# Patient Record
Sex: MALE | Race: WHITE | NOT HISPANIC OR LATINO | URBAN - METROPOLITAN AREA
[De-identification: names, ages, dates, MRNs, and addresses within clinical notes are randomized per-mention and may not be internally consistent; named-entity substitution may affect disease eponyms.]

---

## 2023-04-03 ENCOUNTER — EMERGENCY (EMERGENCY)
Facility: HOSPITAL | Age: 68
LOS: 1 days | Discharge: HOME | End: 2023-04-03
Attending: EMERGENCY MEDICINE | Admitting: EMERGENCY MEDICINE
Payer: MEDICARE

## 2023-04-03 VITALS
HEIGHT: 70 IN | HEART RATE: 105 BPM | RESPIRATION RATE: 16 BRPM | TEMPERATURE: 98 F | OXYGEN SATURATION: 95 % | WEIGHT: 190.04 LBS | DIASTOLIC BLOOD PRESSURE: 77 MMHG | SYSTOLIC BLOOD PRESSURE: 139 MMHG

## 2023-04-03 PROCEDURE — 23650 CLTX SHO DSLC W/MNPJ WO ANES: CPT | Mod: 54,RT

## 2023-04-03 PROCEDURE — 99284 EMERGENCY DEPT VISIT MOD MDM: CPT | Mod: FS,57

## 2023-04-03 PROCEDURE — 73030 X-RAY EXAM OF SHOULDER: CPT | Mod: 26,RT

## 2023-04-03 NOTE — ED PROVIDER NOTE - OBJECTIVE STATEMENT
67-year-old male with history of hypertension, hyperlipidemia, diabetes, RSD presents to the ED complaining of right shoulder pain status post trip and fall while walking his dog a few hours ago.  Patient landed on his right shoulder and feels like it is dislocated.  No additional injury.  No head trauma or LOC.  No anticoagulation.  Denies fever, chills, chest pain, shortness of breath, abdominal pain, nausea, vomiting, upper or lower extremity weakness or paresthesias.

## 2023-04-03 NOTE — ED ADULT TRIAGE NOTE - NS ED TRIAGE AVPU SCALE
Alert-The patient is alert, awake and responds to voice. The patient is oriented to time, place, and person. The triage nurse is able to obtain subjective information.
glasses

## 2023-04-03 NOTE — ED PROVIDER NOTE - NS ED ATTENDING STATEMENT MOD
This was a shared visit with the LESLY. I reviewed and verified the documentation and independently performed the documented:

## 2023-04-03 NOTE — ED PROVIDER NOTE - NSFOLLOWUPINSTRUCTIONS_ED_ALL_ED_FT
Shoulder Dislocation    WHAT YOU NEED TO KNOW:    A shoulder dislocation happens when the top of your arm bone (humerus) moves out of the socket in your shoulder blade.        DISCHARGE INSTRUCTIONS:    Return to the emergency department if:   •Your shoulder and arm become pale or cold.      •You cannot move your shoulder and arm.      •You have more redness or swelling in your shoulder.      •Your shoulder becomes dislocated again.      Call your doctor if:   •You have a fever.      •You have more pain in your shoulder and arm even after you rest and take your medicine.      •You have new weakness or numbness in your shoulder and arm.      •You have questions or concerns about your condition or care.      Medicines:You may need any of the following:   •Prescription pain medicine may be given. Ask your healthcare provider how to take this medicine safely. Some prescription pain medicines contain acetaminophen. Do not take other medicines that contain acetaminophen without talking to your healthcare provider. Too much acetaminophen may cause liver damage. Prescription pain medicine may cause constipation. Ask your healthcare provider how to prevent or treat constipation.       •A muscle relaxer may help the tight muscles in your shoulder relax.      •Take your medicine as directed. Contact your healthcare provider if you think your medicine is not helping or if you have side effects. Tell him or her if you are allergic to any medicine. Keep a list of the medicines, vitamins, and herbs you take. Include the amounts, and when and why you take them. Bring the list or the pill bottles to follow-up visits. Carry your medicine list with you in case of an emergency.      Rest your shoulder and arm: Rest helps your muscles and tissues heal. Avoid activities that cause pain or use an overhead arm motion. Your healthcare provider will tell you when it is safe to return to sports or other daily activities.    How to wear a brace, sling, or splint: A brace, sling, or splint may be needed to limit your movement and protect your shoulder.    Shoulder Sling     •Wear your brace, sling, or splint all the time. Take it off only to bathe or do exercises as directed. Ask your healthcare provider how many weeks you should wear it.      •Keep your skin clean and dry. Place padding under your armpit to help absorb sweat and prevent sores on your skin.      •Do not hunch your shoulders. This may cause pain. Keep your shoulders relaxed.      •Support your wrist and hand with the sling. Cover the knuckles on your hand with your sling. Your wrist should be positioned higher than your elbow. Your wrist may start to hurt or go numb if your sling is too short.      Apply ice: Ice helps decrease swelling and pain. Ice may also help prevent tissue damage. Use an ice pack, or put crushed ice in a plastic bag. Cover it with a towel before you place it on your shoulder. Apply ice for 15 to 20 minutes every hour or as directed.    Exercises: Begin gentle exercises as directed. After healing begins, you may start light exercises so your shoulder does not get stiff. Go to physical therapy if directed. A physical therapist teaches you exercises to help improve movement and strength, and to decrease pain. Do not lift heavy objects or do any exercise that causes severe pain.    Follow up with your doctor in 5 to 10 days: Write down your questions so you remember to ask them during your visits.

## 2023-04-03 NOTE — ED PROVIDER NOTE - CARE PROVIDER_API CALL
Howard Campbell)  Orthopedics  [address], [address]  [place] [address]  Phone: [phone]  Fax: [phone]  Follow Up Time: 1-3 Days

## 2023-04-03 NOTE — ED PROVIDER NOTE - MUSCULOSKELETAL MINIMAL EXAM
+ TTP to right shoulder with decreased ROM and deformity. no ttp right elbow or wrist. radial pulses equal and intact bilaterally.

## 2023-04-03 NOTE — ED PROVIDER NOTE - CLINICAL SUMMARY MEDICAL DECISION MAKING FREE TEXT BOX
Patient is a 67-year-old male who presents to the emergency room with a chief complaint of shoulder pain.  Past medical history of hypertension hyperlipidemia diabetes RSD previous operative repair to the left shoulder.  Patient is right-hand dominant.  Patient reports that he has been experiencing right shoulder pain status post mechanical trip and fall while walking his dog several hours ago.  He reports he lost his balance and fell to the ground landing on his right shoulder, he states that it feels like his shoulder is dislocated.  Denies any head injury or trauma.  There is no LOC and patient is not on anticoagulants.  He has been ambulatory since without issue.  Denies neck or back pain.  Denies extremity numbness or weakness.  Patient is on chronic pain medication and did take oxycodone prior to arrival.  On exam patient is sitting up in bed holding his right arm flexed at the elbow and against his chest. Deformity noted to shoulder joint. No TTP to humerus, elbow, forearm, wrist, hand.  Neurovascular intact with a positive radial pulse cap refill less than 2 seconds and grossly intact sensation.  No skin break noted.  No midline C-T-L tenderness to palpation.  No additional injury noted.  Patient presenting to the emergency room with a chief complaint of right shoulder pain high suspicion for joint dislocation.  Will obtain x-ray and medicate for pain and monitor.  X-ray of the shoulder reveals a right shoulder dislocation no acute fracture noted.  Remains neurovascular intact.  Using the Cunningham technique shoulder was reduced at the bedside.  Patient tolerated well.  Repeat imaging reveals reduction of the shoulder.  Patient placed in sling.  Advised on need for orthopedic follow-up.  Stable for discharge home with outpatient follow-up.

## 2023-04-03 NOTE — ED PROVIDER NOTE - PATIENT PORTAL LINK FT
You can access the FollowMyHealth Patient Portal offered by [facility] by registering at the following website: [email or web address]. By joining [employer]’s FollowMyHealth portal, you will also be able to view your health information using other applications (apps) compatible with our system.

## 2023-04-04 PROCEDURE — 99284 EMERGENCY DEPT VISIT MOD MDM: CPT | Mod: 25

## 2023-04-04 PROCEDURE — 23650 CLTX SHO DSLC W/MNPJ WO ANES: CPT | Mod: RT

## 2023-04-04 PROCEDURE — 73030 X-RAY EXAM OF SHOULDER: CPT

## 2024-05-07 ENCOUNTER — APPOINTMENT (OUTPATIENT)
Dept: GASTROENTEROLOGY | Facility: CLINIC | Age: 69
End: 2024-05-07